# Patient Record
Sex: FEMALE | Race: WHITE | NOT HISPANIC OR LATINO
[De-identification: names, ages, dates, MRNs, and addresses within clinical notes are randomized per-mention and may not be internally consistent; named-entity substitution may affect disease eponyms.]

---

## 2017-06-08 ENCOUNTER — RESULT REVIEW (OUTPATIENT)
Age: 62
End: 2017-06-08

## 2018-06-21 ENCOUNTER — RESULT REVIEW (OUTPATIENT)
Age: 63
End: 2018-06-21

## 2019-07-18 ENCOUNTER — RESULT REVIEW (OUTPATIENT)
Age: 64
End: 2019-07-18

## 2020-07-30 ENCOUNTER — RESULT REVIEW (OUTPATIENT)
Age: 65
End: 2020-07-30

## 2021-06-28 ENCOUNTER — APPOINTMENT (OUTPATIENT)
Dept: OTOLARYNGOLOGY | Facility: CLINIC | Age: 66
End: 2021-06-28
Payer: COMMERCIAL

## 2021-06-28 VITALS
SYSTOLIC BLOOD PRESSURE: 128 MMHG | HEART RATE: 70 BPM | BODY MASS INDEX: 23.92 KG/M2 | WEIGHT: 130 LBS | HEIGHT: 62 IN | DIASTOLIC BLOOD PRESSURE: 79 MMHG | TEMPERATURE: 97.2 F

## 2021-06-28 DIAGNOSIS — R42 DIZZINESS AND GIDDINESS: ICD-10-CM

## 2021-06-28 DIAGNOSIS — J30.2 OTHER SEASONAL ALLERGIC RHINITIS: ICD-10-CM

## 2021-06-28 PROCEDURE — 99213 OFFICE O/P EST LOW 20 MIN: CPT

## 2021-06-28 PROCEDURE — 99072 ADDL SUPL MATRL&STAF TM PHE: CPT

## 2021-06-28 RX ORDER — BENAZEPRIL HYDROCHLORIDE 20 MG/1
20 TABLET, FILM COATED ORAL
Qty: 90 | Refills: 0 | Status: ACTIVE | COMMUNITY
Start: 2021-04-02

## 2021-06-28 RX ORDER — ALPRAZOLAM 0.25 MG/1
0.25 TABLET ORAL
Qty: 90 | Refills: 0 | Status: ACTIVE | COMMUNITY
Start: 2021-06-02

## 2021-06-28 RX ORDER — LEVOTHYROXINE SODIUM 0.05 MG/1
50 TABLET ORAL
Qty: 90 | Refills: 0 | Status: ACTIVE | COMMUNITY
Start: 2021-04-02

## 2021-06-28 RX ORDER — ROSUVASTATIN CALCIUM 10 MG/1
10 TABLET, FILM COATED ORAL
Qty: 90 | Refills: 0 | Status: ACTIVE | COMMUNITY
Start: 2021-01-06

## 2021-06-28 NOTE — PHYSICAL EXAM
[Midline] : trachea located in midline position [de-identified] : enlarged [Normal] : no nystagmus [] : Tandem Romberg test is negative

## 2021-06-28 NOTE — ASSESSMENT
[FreeTextEntry1] : [facility] to fly\par \par continue Flonase\par rx Azelastine\par \par f/u prn failure to improve

## 2021-06-28 NOTE — REASON FOR VISIT
[Subsequent Evaluation] : a subsequent evaluation for [FreeTextEntry2] : allergies and ear wax removal

## 2021-06-28 NOTE — REVIEW OF SYSTEMS
[Sneezing] : sneezing [Seasonal Allergies] : seasonal allergies [Post Nasal Drip] : post nasal drip [Dizziness] : dizziness [Vertigo] : vertigo [Lightheadedness] : lightheadedness [Sinus Pain] : sinus pain [Sinus Pressure] : sinus pressure [Throat Clearing] : throat clearing [Throat Dryness] : throat dryness [Throat Itching] : throat itching [Eye Pain] : eye pain [Eyes Itch] : itching of the eyes [Palpitations] : palpitations [Shortness Of Breath] : shortness of breath [Heartburn] : heartburn [Joint Pain] : joint pain [Easy Bruising] : a tendency for easy bruising [Negative] : Psychiatric [As Noted in HPI] : as noted in HPI [FreeTextEntry3] : watery eyes  [FreeTextEntry9] : muscle aches  [FreeTextEntry1] : fatigue, sweating at night, daytime sleepiness

## 2021-06-28 NOTE — HISTORY OF PRESENT ILLNESS
[de-identified] : 65 yr old female c/o allergy flare up w nasal/head congestion, izabella-orbital discomfort for about 3 weeks.  +vertigo over the past 3 weeks with any change in position, improving.  Had tinnitus for a couple of days, has resolved\par Using Flonase prn, restarted it last week w improvement in nasal symptoms.\par Not much relief w Allegra. \par -fever, discolored mucous\par Has plans to fly in 10 days

## 2021-07-21 RX ORDER — AZELASTINE HYDROCHLORIDE 137 UG/1
0.1 SPRAY, METERED NASAL TWICE DAILY
Qty: 3 | Refills: 3 | Status: ACTIVE | COMMUNITY
Start: 2021-06-28 | End: 1900-01-01

## 2021-08-05 ENCOUNTER — RESULT REVIEW (OUTPATIENT)
Age: 66
End: 2021-08-05

## 2023-06-19 ENCOUNTER — APPOINTMENT (OUTPATIENT)
Dept: OTOLARYNGOLOGY | Facility: CLINIC | Age: 68
End: 2023-06-19
Payer: COMMERCIAL

## 2023-06-19 VITALS
SYSTOLIC BLOOD PRESSURE: 119 MMHG | BODY MASS INDEX: 23.37 KG/M2 | HEART RATE: 70 BPM | DIASTOLIC BLOOD PRESSURE: 79 MMHG | HEIGHT: 62 IN | WEIGHT: 127 LBS

## 2023-06-19 DIAGNOSIS — H90.3 SENSORINEURAL HEARING LOSS, BILATERAL: ICD-10-CM

## 2023-06-19 DIAGNOSIS — H69.83 OTHER SPECIFIED DISORDERS OF EUSTACHIAN TUBE, BILATERAL: ICD-10-CM

## 2023-06-19 DIAGNOSIS — H93.291 OTHER ABNORMAL AUDITORY PERCEPTIONS, RIGHT EAR: ICD-10-CM

## 2023-06-19 PROCEDURE — 92567 TYMPANOMETRY: CPT

## 2023-06-19 PROCEDURE — 99214 OFFICE O/P EST MOD 30 MIN: CPT

## 2023-06-19 PROCEDURE — 92557 COMPREHENSIVE HEARING TEST: CPT

## 2023-06-19 RX ORDER — AZELASTINE HYDROCHLORIDE 137 UG/1
0.1 SPRAY, METERED NASAL TWICE DAILY
Qty: 3 | Refills: 3 | Status: ACTIVE | COMMUNITY
Start: 2023-06-19 | End: 1900-01-01

## 2023-06-19 NOTE — HISTORY OF PRESENT ILLNESS
[de-identified] : 67 yr old female c/o clog AD, flying soon\par denies recent URI, allergy\par -otalgia, tinnitus, dizzy\par -hx otitis, noise exp, head trauma\par +FH son related to childhood otitis\par \par has used flonase in the past without relief, but azelastine helped

## 2023-06-19 NOTE — ASSESSMENT
[FreeTextEntry1] :  w  mild SNHL at [facility] w type A, ETD AU\par rx Azelastine\par [facility] to fly\par annual audio

## 2023-06-19 NOTE — DATA REVIEWED
[de-identified] : \par -TYMPS: TYPE A AU (ETF ABNORMAL AU)\par -HEARING ESSENTIALLY WNL .25-8KHZ, WITH A MILD SNHL AT [facility] AU

## 2024-07-25 ENCOUNTER — NON-APPOINTMENT (OUTPATIENT)
Age: 69
End: 2024-07-25

## 2024-07-29 ENCOUNTER — APPOINTMENT (OUTPATIENT)
Dept: OTOLARYNGOLOGY | Facility: CLINIC | Age: 69
End: 2024-07-29
Payer: COMMERCIAL

## 2024-07-29 VITALS
BODY MASS INDEX: 23.37 KG/M2 | HEIGHT: 62 IN | SYSTOLIC BLOOD PRESSURE: 134 MMHG | DIASTOLIC BLOOD PRESSURE: 89 MMHG | HEART RATE: 81 BPM | WEIGHT: 127 LBS

## 2024-07-29 DIAGNOSIS — M26.69 OTHER SPECIFIED DISORDERS OF TEMPOROMANDIBULAR JOINT: ICD-10-CM

## 2024-07-29 DIAGNOSIS — H61.21 IMPACTED CERUMEN, RIGHT EAR: ICD-10-CM

## 2024-07-29 DIAGNOSIS — H93.291 OTHER ABNORMAL AUDITORY PERCEPTIONS, RIGHT EAR: ICD-10-CM

## 2024-07-29 PROCEDURE — 69210 REMOVE IMPACTED EAR WAX UNI: CPT | Mod: RT

## 2024-07-29 PROCEDURE — 99213 OFFICE O/P EST LOW 20 MIN: CPT | Mod: 25

## 2024-07-29 NOTE — PHYSICAL EXAM
[de-identified] : sl tender on the right [de-identified] : CI AD [Normal] : mucosa is normal [Midline] : trachea located in midline position

## 2024-07-29 NOTE — HISTORY OF PRESENT ILLNESS
[de-identified] : 68 yr old female feels like there's something in her right ear since early July -otalgia, tinnitus, dizzy denies recent URI denies seasonal allergy +Azelastine prn  +TMJ, recently worse after eating a large sandwich

## 2024-11-01 ENCOUNTER — RX RENEWAL (OUTPATIENT)
Age: 69
End: 2024-11-01

## 2024-11-01 RX ORDER — AZELASTINE HYDROCHLORIDE 137 UG/1
137 SPRAY, METERED NASAL
Qty: 3 | Refills: 3 | Status: ACTIVE | COMMUNITY
Start: 2024-11-01 | End: 1900-01-01